# Patient Record
Sex: MALE | Race: BLACK OR AFRICAN AMERICAN | Employment: FULL TIME | URBAN - METROPOLITAN AREA
[De-identification: names, ages, dates, MRNs, and addresses within clinical notes are randomized per-mention and may not be internally consistent; named-entity substitution may affect disease eponyms.]

---

## 2019-11-19 ENCOUNTER — HOSPITAL ENCOUNTER (EMERGENCY)
Facility: CLINIC | Age: 39
Discharge: HOME | End: 2019-11-20
Attending: EMERGENCY MEDICINE | Admitting: EMERGENCY MEDICINE

## 2019-11-19 DIAGNOSIS — M25.512 PAIN IN JOINT OF LEFT SHOULDER: ICD-10-CM

## 2019-11-19 LAB
ANION GAP SERPL CALCULATED.3IONS-SCNC: 2 MMOL/L (ref 3–14)
BASOPHILS # BLD AUTO: 0 10E9/L (ref 0–0.2)
BASOPHILS NFR BLD AUTO: 0.7 %
BUN SERPL-MCNC: 16 MG/DL (ref 7–30)
CALCIUM SERPL-MCNC: 8.8 MG/DL (ref 8.5–10.1)
CHLORIDE SERPL-SCNC: 108 MMOL/L (ref 94–109)
CK SERPL-CCNC: 334 U/L (ref 30–300)
CO2 SERPL-SCNC: 28 MMOL/L (ref 20–32)
CREAT SERPL-MCNC: 1 MG/DL (ref 0.66–1.25)
DIFFERENTIAL METHOD BLD: ABNORMAL
EOSINOPHIL # BLD AUTO: 0.2 10E9/L (ref 0–0.7)
EOSINOPHIL NFR BLD AUTO: 3.7 %
ERYTHROCYTE [DISTWIDTH] IN BLOOD BY AUTOMATED COUNT: 12.6 % (ref 10–15)
GFR SERPL CREATININE-BSD FRML MDRD: >90 ML/MIN/{1.73_M2}
GLUCOSE SERPL-MCNC: 76 MG/DL (ref 70–99)
HCT VFR BLD AUTO: 39.1 % (ref 40–53)
HGB BLD-MCNC: 13.3 G/DL (ref 13.3–17.7)
IMM GRANULOCYTES # BLD: 0 10E9/L (ref 0–0.4)
IMM GRANULOCYTES NFR BLD: 0.2 %
LYMPHOCYTES # BLD AUTO: 1.8 10E9/L (ref 0.8–5.3)
LYMPHOCYTES NFR BLD AUTO: 43.7 %
MCH RBC QN AUTO: 27.5 PG (ref 26.5–33)
MCHC RBC AUTO-ENTMCNC: 34 G/DL (ref 31.5–36.5)
MCV RBC AUTO: 81 FL (ref 78–100)
MONOCYTES # BLD AUTO: 0.5 10E9/L (ref 0–1.3)
MONOCYTES NFR BLD AUTO: 11.3 %
NEUTROPHILS # BLD AUTO: 1.6 10E9/L (ref 1.6–8.3)
NEUTROPHILS NFR BLD AUTO: 40.4 %
NRBC # BLD AUTO: 0 10*3/UL
NRBC BLD AUTO-RTO: 0 /100
PLATELET # BLD AUTO: 238 10E9/L (ref 150–450)
POTASSIUM SERPL-SCNC: 4 MMOL/L (ref 3.4–5.3)
RBC # BLD AUTO: 4.84 10E12/L (ref 4.4–5.9)
SODIUM SERPL-SCNC: 138 MMOL/L (ref 133–144)
WBC # BLD AUTO: 4.1 10E9/L (ref 4–11)

## 2019-11-19 PROCEDURE — 82550 ASSAY OF CK (CPK): CPT | Performed by: EMERGENCY MEDICINE

## 2019-11-19 PROCEDURE — 96360 HYDRATION IV INFUSION INIT: CPT

## 2019-11-19 PROCEDURE — 80048 BASIC METABOLIC PNL TOTAL CA: CPT | Performed by: EMERGENCY MEDICINE

## 2019-11-19 PROCEDURE — 25800030 ZZH RX IP 258 OP 636: Performed by: EMERGENCY MEDICINE

## 2019-11-19 PROCEDURE — 99285 EMERGENCY DEPT VISIT HI MDM: CPT | Mod: 25

## 2019-11-19 PROCEDURE — 85025 COMPLETE CBC W/AUTO DIFF WBC: CPT | Performed by: EMERGENCY MEDICINE

## 2019-11-19 RX ADMIN — SODIUM CHLORIDE 1000 ML: 9 INJECTION, SOLUTION INTRAVENOUS at 23:24

## 2019-11-19 ASSESSMENT — MIFFLIN-ST. JEOR: SCORE: 2006.73

## 2019-11-19 ASSESSMENT — ENCOUNTER SYMPTOMS
VOMITING: 0
NAUSEA: 0
COUGH: 0
ABDOMINAL PAIN: 0

## 2019-11-19 NOTE — ED AVS SNAPSHOT
Emergency Department  [address] [place] [id number]  Phone:  [phone]  Fax:  [phone]                                    Ham Lee   MRN: [id number]    Department:   Emergency Department   Date of Visit:  11/19/2019           After Visit Summary Signature Page    I have received my discharge instructions, and my questions have been answered. I have discussed any challenges I see with this plan with the nurse or doctor.    ..........................................................................................................................................  Patient/Patient Representative Signature      ..........................................................................................................................................  Patient Representative Print Name and Relationship to Patient    ..................................................               ................................................  Date                                   Time    ..........................................................................................................................................  Reviewed by Signature/Title    ...................................................              ..............................................  Date                                               Time          22EPIC Rev 08/18

## 2019-11-20 ENCOUNTER — APPOINTMENT (OUTPATIENT)
Dept: GENERAL RADIOLOGY | Facility: CLINIC | Age: 39
End: 2019-11-20
Attending: EMERGENCY MEDICINE

## 2019-11-20 VITALS
BODY MASS INDEX: 26.18 KG/M2 | RESPIRATION RATE: 18 BRPM | HEIGHT: 76 IN | TEMPERATURE: 98.5 F | DIASTOLIC BLOOD PRESSURE: 82 MMHG | WEIGHT: 215 LBS | HEART RATE: 62 BPM | SYSTOLIC BLOOD PRESSURE: 135 MMHG | OXYGEN SATURATION: 99 %

## 2019-11-20 PROCEDURE — 25000132 ZZH RX MED GY IP 250 OP 250 PS 637: Performed by: EMERGENCY MEDICINE

## 2019-11-20 PROCEDURE — 73502 X-RAY EXAM HIP UNI 2-3 VIEWS: CPT

## 2019-11-20 PROCEDURE — 73030 X-RAY EXAM OF SHOULDER: CPT | Mod: LT

## 2019-11-20 RX ORDER — ACETAMINOPHEN 500 MG
1000 TABLET ORAL ONCE
Status: COMPLETED | OUTPATIENT
Start: 2019-11-20 | End: 2019-11-20

## 2019-11-20 RX ORDER — IBUPROFEN 600 MG/1
600 TABLET, FILM COATED ORAL ONCE
Status: COMPLETED | OUTPATIENT
Start: 2019-11-20 | End: 2019-11-20

## 2019-11-20 RX ORDER — IBUPROFEN 600 MG/1
600 TABLET, FILM COATED ORAL EVERY 6 HOURS PRN
Qty: 20 TABLET | Refills: 0 | Status: SHIPPED | OUTPATIENT
Start: 2019-11-20

## 2019-11-20 RX ADMIN — ACETAMINOPHEN 1000 MG: 500 TABLET, FILM COATED ORAL at 00:11

## 2019-11-20 RX ADMIN — IBUPROFEN 600 MG: 600 TABLET ORAL at 00:11

## 2019-11-20 NOTE — DISCHARGE INSTRUCTIONS
Try taking ibuprofen 600mg every 6 hours with food.  Return if you have worsening pain, swelling, redness, or fevers.

## 2019-11-20 NOTE — ED PROVIDER NOTES
"  History     Chief Complaint:  Tingling    HPI   Dacquan Angel Lee is a 38 year old male who presents with tingling. The patient states that he developed left sided hip pain three days ago. He states that he also started to have intermittent tingling in the left hip that radiates to the left arm and shoulder yesterday with associated pain in the shoulder, but the pain has stayed constant. He reports that the pain in his shoulder now also radiates to the left side of his neck. The patient states that he cannot put pressure on his left side due to the pain. The patient does go to the gym, but states he did his normal exercise routine. He denies any trauma or MVC. He has not tried tylenol or ibuprofen. He denies any cough, vomiting, diarrhea, chest pain, or abdominal pain.     Allergies:  No Known Drug Allergies    Medications:    Medications reviewed. No current medications.     Past Medical History:    Rhabdomyolysis    Past Surgical History:    Surgical history reviewed. No pertinent surgical history.    Family History:    Family history reviewed. No pertinent family history.      Social History:  The patient was accompanied to the ED by family member.  The patient is a .     Review of Systems   Respiratory: Negative for cough.    Cardiovascular: Negative for chest pain.   Gastrointestinal: Negative for abdominal pain, nausea and vomiting.   Musculoskeletal:        Left hip and arm pain   Neurological:        Left hip and arm tingling   All other systems reviewed and are negative.    Physical Exam     Patient Vitals for the past 24 hrs:   BP Temp Temp src Pulse Resp SpO2 Height Weight   11/20/19 0000 135/82 -- -- 62 -- 99 % -- --   11/19/19 2345 -- -- -- -- -- 99 % -- --   11/19/19 2330 130/88 -- -- 58 -- 99 % -- --   11/19/19 2112 (!) 144/75 98.5  F (36.9  C) Oral 69 18 99 % 1.93 m (6' 4\") 97.5 kg (215 lb)      Physical Exam  General: Appears well-developed and well-nourished.   Head: No " signs of trauma.   Mouth/Throat: Oropharynx is clear and moist.   Eyes: Conjunctivae are normal.   Neck: Normal range of motion. No nuchal rigidity. No cervical adenopathy.  No midline tenderness.  CV: Normal rate and regular rhythm.    Resp: Effort normal and breath sounds normal. No respiratory distress.   GI: Soft. There is no tenderness.  No rebound or guarding.  Normal bowel sounds.  No CVA tenderness.  MSK: Normal range of motion. Mild tenderness to left lateral shoulder.  No bony deformities.  No joint swelling to hip/shoulder or else where.  No erythema or warmth.  Normal ROM of joints.  Able to stand and ambulate without limp.  Neuro: The patient is alert and oriented to person, place, and time.  PERRLA, EOMI, strength in upper/lower extremities normal and symmetrical. Sensation normal. Speech normal.  GCS 15  Skin: Skin is warm and dry. No rash noted.   Psych: normal mood and affect. behavior is normal.     Emergency Department Course   Imaging:  Radiology findings were communicated with the patient who voiced understanding of the findings.    PELVIS AND LEFT HIP 2 VIEWS   1. No visualized acute fracture, malalignment or other acute osseous  abnormality of the pelvis or left hip.  2. A nonspecific 2.6 cm long x 0.7 cm diameter cylindrical radiopaque  foreign object projects over the intertrochanteric region of the right  hip. This could be external to the   JANE GOMEZ MD  Reading per radiology     LEFT SHOULDER 3 VIEWS  IMPRESSION: No visualized acute fracture, malalignment or other acute  osseous abnormality of the left shoulder.  JANE GOMEZ MD  Reading per radiology     Laboratory:  Laboratory findings were communicated with the patient who voiced understanding of the findings.    CBC: WBC 4.1, HGB 13.3,    BMP: anion gap 2 (L), o/w WNL (Creatinine 1.00)   CK total: 334 (H)    Interventions:  2324 0.9% sodium chloride BOLUS 1000 mL IV  0011 Tylenol 1000 mg oral   0011 Ibuprofen 600 mg  oral     Emergency Department Course:  Past medical records, nursing notes, and vitals reviewed.    2307 I performed an exam of the patient as documented above.      IV was inserted and blood was drawn for laboratory testing, results above.      0005 I returned to check on the  We discussed the lab results and elected to preform imaging of the Hip and Shoulder.     The patient was sent for a shoulder, pelvis, and hip x-ray while in the emergency department, results above.       0138 Patient rechecked and updated.       Findings and plan explained to the Patient. Patient discharged home with instructions regarding supportive care, medications, and reasons to return. The importance of close follow-up was reviewed. The patient was prescribed ibuprofen.       Impression & Plan   Medical Decision Making:  Ham Lee is a 38 year old male who presents to the emergency department today with left hip and left shoulder pain.  Reports over the last number of days has had some pain in the left hip and shoulder.  He reports that they feel sore and seems somewhat worse when he is moving around.  Reports that he does exercise regularly, but has not done anything different or out of the ordinary and there is no trauma.  On my evaluation, there is no swelling, erythema, warmth, or other concerning findings to the joints or remainder of his physical exam.  He is able to stand and ambulate without difficulty with normal gait.  Patient did ask a couple occasions about x-rays, so I did obtained these which did not show any acute process and I have a low suspicion for an occult fracture given the lack of significant injury.  I did do basic blood work which showed a normal white blood cell count and appropriate electrolytes.  He reported a history of rhabdo past so I did do a CK which was non-concerning and clinically have a low suspicion for this based on history.  The exact cause of his discomfort is not clear.  Clinically  there is no signs of a septic joint.  He was given Tylenol and Ibuprofen with improvement of his symptoms I recommended he continue with ibuprofen as an inflammatory.  Recommend follow with primary care doctor return for any worsening symptoms or further concerns.      Discharge Diagnosis:    ICD-10-CM    1. Pain in joint of left shoulder M25.512        Disposition:  The patient is discharged to home.    Discharge Medications:  Discharge Medication List as of 11/20/2019  1:39 AM      START taking these medications    Details   ibuprofen (ADVIL/MOTRIN) 600 MG tablet Take 1 tablet (600 mg) by mouth every 6 hours as needed for moderate pain, Disp-20 tablet, R-0, Local Print             Scribe Disclosure:  ILg, am serving as a scribe at 11:02 PM on 11/19/2019 to document services personally performed by Alden Rangel MD based on my observations and the provider's statements to me.      I, Samreen Serrano, am serving as a scribe at 11:02 PM on 11/19/2019 to document services personally performed by Alden Rangel MD based on my observations and the provider's statements to me.    11/19/2019    EMERGENCY DEPARTMENT       Alden Rangel MD  11/21/19 0126